# Patient Record
Sex: FEMALE | Race: WHITE | NOT HISPANIC OR LATINO | Employment: FULL TIME | URBAN - METROPOLITAN AREA
[De-identification: names, ages, dates, MRNs, and addresses within clinical notes are randomized per-mention and may not be internally consistent; named-entity substitution may affect disease eponyms.]

---

## 2023-07-17 ENCOUNTER — TELEMEDICINE (OUTPATIENT)
Dept: PRIMARY CARE | Facility: CLINIC | Age: 63
End: 2023-07-17
Payer: COMMERCIAL

## 2023-07-17 DIAGNOSIS — L03.115 CELLULITIS OF RIGHT LOWER EXTREMITY: Primary | ICD-10-CM

## 2023-07-17 PROBLEM — Z15.01 GENETIC PREDISPOSITION TO MALIGNANT NEOPLASM OF BREAST: Status: ACTIVE | Noted: 2023-07-17

## 2023-07-17 PROBLEM — K21.9 GERD (GASTROESOPHAGEAL REFLUX DISEASE): Status: ACTIVE | Noted: 2023-07-17

## 2023-07-17 PROCEDURE — 99213 OFFICE O/P EST LOW 20 MIN: CPT | Performed by: PHYSICIAN ASSISTANT

## 2023-07-17 RX ORDER — SOD SULF/POT CHLORIDE/MAG SULF 1.479 G
TABLET ORAL
COMMUNITY
Start: 2023-02-20 | End: 2023-07-17 | Stop reason: WASHOUT

## 2023-07-17 RX ORDER — PNV NO.95/FERROUS FUM/FOLIC AC 28MG-0.8MG
TABLET ORAL
COMMUNITY

## 2023-07-17 RX ORDER — ESOMEPRAZOLE MAGNESIUM 40 MG/1
40 CAPSULE, DELAYED RELEASE ORAL
COMMUNITY

## 2023-07-17 RX ORDER — DOXYCYCLINE 100 MG/1
100 CAPSULE ORAL 2 TIMES DAILY
Qty: 20 CAPSULE | Refills: 0 | Status: SHIPPED | OUTPATIENT
Start: 2023-07-17 | End: 2023-07-27

## 2023-07-17 NOTE — PROGRESS NOTES
An interactive audio and video telecommunication system which permits real time communications between the patient (at the originating site) and provider (at the distant site) was utilized to provide this telehealth service.   Verbal consent was requested and obtained from Cherie Ivey on this date, 23 for a telehealth visit.     Subjective    Cherie Ivey is a 62 y.o. year old female patient presenting for virtual visit   Chief Complaint   Patient presents with    Rash      Right medial thigh rash with redness and itching and firmness surounding. Was bitten by something a few days ago. Has been painful since it was first noted.    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    GERD (gastroesophageal reflux disease)       Past Medical History:   Diagnosis Date    Elevated blood-pressure reading, without diagnosis of hypertension 10/15/2014    Elevated blood pressure reading without diagnosis of hypertension    Encounter for screening for malignant neoplasm of cervix 10/15/2014    Cervical cancer screening    Encounter for screening for malignant neoplasm of colon 10/15/2014    Encounter for screening for malignant neoplasm of colon    Encounter for screening mammogram for malignant neoplasm of breast 10/15/2014    Encounter for screening mammogram for malignant neoplasm of breast    Lateral epicondylitis, unspecified elbow 2015    Lateral epicondylitis    Lateral epicondylitis, unspecified elbow 10/15/2014    Lateral epicondylitis (tennis elbow)    Other abnormal cytological findings on specimens from cervix uteri 10/26/2014    Benign-appearing endometrial cells on cervical Pap smear    Pain in left shoulder 10/15/2014    Left shoulder pain      Past Surgical History:   Procedure Laterality Date     SECTION, CLASSIC  2014     Section    OTHER SURGICAL HISTORY  2014    Wrist Excision Of Ganglion      No family  history on file.   Social History     Tobacco Use    Smoking status: Not on file    Smokeless tobacco: Not on file   Substance Use Topics    Alcohol use: Not on file        Current Outpatient Medications:     Sutab 1.479-0.188- 0.225 gram tablet, , Disp: , Rfl:     cyanocobalamin (Vitamin B-12) 100 mcg tablet, Take by mouth., Disp: , Rfl:     doxycycline (Vibramycin) 100 mg capsule, Take 1 capsule (100 mg) by mouth 2 times a day for 10 days. Take with at least 8 ounces (large glass) of water, do not lie down for 30 minutes after, Disp: 20 capsule, Rfl: 0    esomeprazole (NexIUM) 40 mg DR capsule, Take 1 capsule (40 mg) by mouth., Disp: , Rfl:      Review of Systems    Review of Systems:   Constitutional: Denies fever  HEENT: Denies ST, earache  CVS: Denies Chest pain  Pulmonary: Denies wheezing, SOB  GI: Denies N/V  : Denies dysuria  Musculoskeletal:  Denies myalgia  Neuro: Denies focal weakness or numbness.  Skin: Denies Rashes.  *Review of Systems is negative unless otherwise mentioned in HPI or ROS above.     Objective    VITALS  Pt does not have vitals available at time of visit.    Exam    Unable to perform physical exam in virtual platform    Assessment/Plan    Problem List Items Addressed This Visit    None  Visit Diagnoses       Cellulitis of right lower extremity    -  Primary    Relevant Medications    doxycycline (Vibramycin) 100 mg capsule                 DISCHARGE    Please schedule a follow up visit     Any prescriptions were sent to the pharmacy, please make sure to pick them up and call if there are any issues or questions.     Thank you for trusting me to be a part of your healthcare.    Take care!     Roxanne George PA-C  [facility]  [phone] [phone]     Please feel free to call the office, or return a message if you have any questions or concerns.